# Patient Record
Sex: MALE | Race: WHITE | NOT HISPANIC OR LATINO | Employment: FULL TIME | URBAN - METROPOLITAN AREA
[De-identification: names, ages, dates, MRNs, and addresses within clinical notes are randomized per-mention and may not be internally consistent; named-entity substitution may affect disease eponyms.]

---
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
## 2021-05-21 ENCOUNTER — APPOINTMENT (EMERGENCY)
Dept: RADIOLOGY | Facility: HOSPITAL | Age: 21
End: 2021-05-21

## 2021-05-21 ENCOUNTER — HOSPITAL ENCOUNTER (EMERGENCY)
Facility: HOSPITAL | Age: 21
Discharge: LEFT BEFORE DISCHARGE | End: 2021-05-21
Attending: EMERGENCY MEDICINE | Admitting: EMERGENCY MEDICINE

## 2021-05-21 VITALS
RESPIRATION RATE: 16 BRPM | TEMPERATURE: 98.2 F | DIASTOLIC BLOOD PRESSURE: 86 MMHG | WEIGHT: 150 LBS | HEART RATE: 86 BPM | SYSTOLIC BLOOD PRESSURE: 132 MMHG | BODY MASS INDEX: 21 KG/M2 | HEIGHT: 71 IN | OXYGEN SATURATION: 99 %

## 2021-05-21 DIAGNOSIS — R11.2 NAUSEA & VOMITING: ICD-10-CM

## 2021-05-21 DIAGNOSIS — R07.9 CHEST PAIN: ICD-10-CM

## 2021-05-21 DIAGNOSIS — T54.91XA BLEACH INGESTION: ICD-10-CM

## 2021-05-21 DIAGNOSIS — R10.13 EPIGASTRIC ABDOMINAL PAIN: ICD-10-CM

## 2021-05-21 DIAGNOSIS — J02.9 SORE THROAT: ICD-10-CM

## 2021-05-21 DIAGNOSIS — R05.8 NONPRODUCTIVE COUGH: ICD-10-CM

## 2021-05-21 DIAGNOSIS — T54.91XA INGESTION OF CORROSIVE CHEMICAL, ACCIDENTAL OR UNINTENTIONAL, INITIAL ENCOUNTER: ICD-10-CM

## 2021-05-21 DIAGNOSIS — T65.91XA ACCIDENTAL INGESTION OF SUBSTANCE, INITIAL ENCOUNTER: Primary | ICD-10-CM

## 2021-05-21 LAB
ATRIAL RATE: 83 BPM
P AXIS: 75 DEGREES
PR INTERVAL: 190 MS
QRS AXIS: 94 DEGREES
QRSD INTERVAL: 90 MS
QT INTERVAL: 368 MS
QTC INTERVAL: 432 MS
T WAVE AXIS: 71 DEGREES
VENTRICULAR RATE: 83 BPM

## 2021-05-21 PROCEDURE — 99284 EMERGENCY DEPT VISIT MOD MDM: CPT

## 2021-05-21 PROCEDURE — 93005 ELECTROCARDIOGRAM TRACING: CPT

## 2021-05-21 PROCEDURE — 99284 EMERGENCY DEPT VISIT MOD MDM: CPT | Performed by: EMERGENCY MEDICINE

## 2021-05-21 PROCEDURE — 71046 X-RAY EXAM CHEST 2 VIEWS: CPT

## 2021-05-21 PROCEDURE — 93010 ELECTROCARDIOGRAM REPORT: CPT | Performed by: INTERNAL MEDICINE

## 2021-05-21 RX ORDER — ONDANSETRON 2 MG/ML
4 INJECTION INTRAMUSCULAR; INTRAVENOUS ONCE
Status: DISCONTINUED | OUTPATIENT
Start: 2021-05-21 | End: 2021-05-21 | Stop reason: HOSPADM

## 2021-05-21 NOTE — DISCHARGE INSTRUCTIONS
Return immediately to the emergency department if you have worsening pain, persistent vomiting and are still unable to tolerate fluids, develop trouble breathing, or for any other concerns

## 2021-05-21 NOTE — ED PROVIDER NOTES
History  Chief Complaint   Patient presents with    Poisoning     pt states he swallowed bleach yesterday but threw it right back up, pt c/o slight pain in his throat, when asked if patient was SI he stated, "I dont want to be put on suicide watch"     HPI    None       Past Medical History:   Diagnosis Date    ADHD     Autism spectrum disorder     per father never " completely dignosed"        History reviewed  No pertinent surgical history  History reviewed  No pertinent family history  I have reviewed and agree with the history as documented  E-Cigarette/Vaping     E-Cigarette/Vaping Substances     Social History     Tobacco Use    Smoking status: Never Smoker    Smokeless tobacco: Never Used   Substance Use Topics    Alcohol use: No     Comment: except for tonight    Drug use: No       Review of Systems    Physical Exam  Physical Exam  Vitals signs and nursing note reviewed  Constitutional:       General: He is not in acute distress  Appearance: He is well-developed  HENT:      Head: Normocephalic and atraumatic  Mouth/Throat:      Palate: No lesions  Pharynx: Posterior oropharyngeal erythema (mild, diffuse) present  No pharyngeal swelling or uvula swelling  Comments: No oropharyngeal lesions or ulcers  Eyes:      Conjunctiva/sclera: Conjunctivae normal       Pupils: Pupils are equal, round, and reactive to light  Neck:      Musculoskeletal: Normal range of motion  Trachea: No tracheal deviation  Cardiovascular:      Rate and Rhythm: Normal rate and regular rhythm  Heart sounds: Normal heart sounds  Pulmonary:      Effort: Pulmonary effort is normal  No respiratory distress  Breath sounds: Normal breath sounds  Comments: Frequent cough at rest, not paroxysmal, not related to deep breaths  Chest:      Chest wall: No tenderness or crepitus  Abdominal:      General: There is no distension  Palpations: Abdomen is soft  Tenderness:  There is abdominal tenderness (mild) in the epigastric area  There is no guarding or rebound  Skin:     General: Skin is warm and dry  Neurological:      Mental Status: He is alert and oriented to person, place, and time  GCS: GCS eye subscore is 4  GCS verbal subscore is 5  GCS motor subscore is 6     Psychiatric:         Behavior: Behavior normal          Vital Signs  ED Triage Vitals [05/21/21 1841]   Temperature Pulse Respirations Blood Pressure SpO2   98 2 °F (36 8 °C) 86 16 132/86 99 %      Temp Source Heart Rate Source Patient Position - Orthostatic VS BP Location FiO2 (%)   Oral Monitor Sitting Left arm --      Pain Score       5           Vitals:    05/21/21 1841   BP: 132/86   Pulse: 86   Patient Position - Orthostatic VS: Sitting         Visual Acuity      ED Medications  Medications   ondansetron (ZOFRAN) injection 4 mg (has no administration in time range)       Diagnostic Studies  Results Reviewed     Procedure Component Value Units Date/Time    CBC and differential [[id number]]     Lab Status: No result Specimen: Blood     Basic metabolic panel [[id number]]     Lab Status: No result Specimen: Blood     Hepatic function panel [[id number]]     Lab Status: No result Specimen: Blood     Lipase [[id number]]     Lab Status: No result Specimen: Blood     Acetaminophen level-"If concentration is detectable, please discuss with medical  on call " [[id number]]     Lab Status: No result Specimen: Blood     Salicylate level [[id number]]     Lab Status: No result Specimen: Blood     Ethanol [[id number]]     Lab Status: No result Specimen: Blood     Rapid drug screen, urine [[id number]]     Lab Status: No result Specimen: Urine                  XR chest 2 views    (Results Pending)              Procedures  ECG 12 Lead Documentation Only    Date/Time: 5/21/2021 7:51 PM  Performed by: Dalila Moreno MD  Authorized by: Dalila Moreno MD     Indications / Diagnosis:  Chest pain  ECG reviewed by me, the ED Provider: yes    Patient location:  ED  Previous ECG:     Previous ECG:  Unavailable  Interpretation:     Interpretation: non-specific    Rate:     ECG rate:  83    ECG rate assessment: normal    Rhythm:     Rhythm: sinus rhythm      Rhythm comment:  Sinus arrhythmia  Ectopy:     Ectopy: none    QRS:     QRS axis:  Right    QRS intervals:  Normal  Conduction:     Conduction: normal    ST segments:     ST segments:  Normal  T waves:     T waves: normal               ED Course                                           MDM  Number of Diagnoses or Management Options  Accidental ingestion of substance, initial encounter: new and requires workup  Bleach ingestion: new and requires workup  Chest pain: new and requires workup  Epigastric abdominal pain: new and requires workup  Ingestion of corrosive chemical, accidental or unintentional, initial encounter: new and requires workup  Nausea & vomiting: new and requires workup  Nonproductive cough: new and requires workup  Sore throat: new and requires workup  Diagnosis management comments: This is a 69-year-old male who presents here today after accidentally ingesting bleach last night  States he was intoxicated, went into the bathroom and thought he was picking up his cup of water, however picked up a cup of bleach that his roommate was using to clean the bathroom  He states he took several swallows before he realized that it tasted funny  He vomited shortly thereafter  He states he has had sore throat, chest pain, and abdominal pain since then  He states he has been trying to drink water but is having a hard time keeping it down  He denies shortness of breath but states he keeps coughing  It is nonproductive  He denies fevers or other URI symptoms  He denies intentionally drinking the bleach  He denies any other Co ingestants  He denies any diarrhea  He has not taken anything for his symptoms    As he has not started feeling better, he came here today for evaluation  Review of systems:  Otherwise negative unless stated above    He is well-appearing, in no acute distress  He has minimal erythema to the oropharynx  He is coughing frequently  Exam is otherwise unremarkable  Concern is for possible esophageal perforation, pneumonitis secondary to inhalation, or acute esophagitis secondary to bleach ingestion and recurrent vomiting  We will start by obtaining a chest x-ray to evaluate for obvious signs of free air, if this is negative we will get CT scan to evaluate further  He will likely need to be seen by GI if this is negative for direct visualization of the area  We will check lab work to evaluate for other contributing factors to his symptoms, complications from recurrent vomiting, or other possible Co ingestants from his which ingestion  Chest x-ray was reviewed by myself, shows no pneumothorax, pneumomediastinum, or air under the diaphragm  I discussed with patient need for CT scan for further evaluation, however the patient declines this and blood work  He states he does not like needles  He is now saying that he does feel better than he did last night when this first happened so does not think that this is significant  He is declining any further evaluation be done  I discussed with him that if he has a small injury it may progress resulting in death prior to him returning to the hospital, or permanent injury or disability  He does express understanding with this plan but still declines further workup  He was able to express understanding that his injury could lead to death if one is missed  He still continues to refuse care, and signed out AMA  He was advised to return immediately if he has worsening symptoms or decides he is amenable to further evaluation         Amount and/or Complexity of Data Reviewed  Clinical lab tests: ordered  Tests in the radiology section of CPT®: reviewed and ordered        Disposition  Final diagnoses: Accidental ingestion of substance, initial encounter   Ingestion of corrosive chemical, accidental or unintentional, initial encounter   Nausea & vomiting   Nonproductive cough   Sore throat   Chest pain   Epigastric abdominal pain   Bleach ingestion     Time reflects when diagnosis was documented in both MDM as applicable and the Disposition within this note     Time User Action Codes Description Comment    5/21/2021  7:38 PM [facility] Add Ciarra Szymanski Accidental ingestion of substance, initial encounter     5/21/2021  7:39 PM [facility] Add [T54 91XA] Ingestion of corrosive chemical, accidental or unintentional, initial encounter     5/21/2021  7:41 PM [facility] Add [R11 2] Nausea & vomiting     5/21/2021  7:41 PM [facility] Add [R05] Nonproductive cough     5/21/2021  7:41 PM [facility] Add [J02 9] Sore throat     5/21/2021  7:41 PM [facility] Add [R07 9] Chest pain     5/21/2021  7:42 PM [facility] Add [R10 13] Epigastric abdominal pain     5/21/2021  7:43 PM [facility] Add [T54 91XA] Bleach ingestion       ED Disposition     ED Disposition Condition Date/Time Comment    [facility]  Fri May 21, 2021  7:38 PM Date: 5/21/2021  Patient: Gideon Dick  Admitted: 5/21/2021  6:38 PM  Attending Provider: Xander Montero or his authorized caregiver has made the decision for the patient to leave the emergency department agains t the advice of his attending physician  He or his authorized caregiver has been informed and understands the inherent risks, including death, permanent damage to lungs, esophagus or stomach  He or his authorized caregiver has decided to accept the  responsibility for this decision  Gideon Dick and all necessary parties have been advised that he may return for further evaluation or treatment   His condition at time of discharge was fair  Radha Speak had current vital signs as follows:   /86 (BP Location: Left arm)   Pulse 86   Temp 98 2 °F (36 8 °C) (Oral)   Resp 16   Ht 5' 11" (1 803 m)   Wt 68 kg (150 lb)         Follow-up Information     Follow up With Specialties Details Why Contact Info Additional [address] [facility] Emergency Department Emergency Medicine Go to  immediately, if you have worsening symptoms, decide you are willing to have CT scan done and further evaluation for injuries from drinking bleach [facility] Emergency Department, [address] [place] [address], [facility]    your primary care doctor  Schedule an appointment as soon as possible for a visit in 3 days to follow up on your symptoms      Carmen Sousa MD Family Medicine Schedule an appointment as soon as possible for a visit  to set up care with a new primary care doctor if you do not have one [address]  [phone]             Patient's Medications    No medications on file     No discharge procedures on file      PDMP Review     None          ED Provider  Electronically Signed by           Simon Quach MD  05/21/21 7713